# Patient Record
Sex: MALE | Race: WHITE
[De-identification: names, ages, dates, MRNs, and addresses within clinical notes are randomized per-mention and may not be internally consistent; named-entity substitution may affect disease eponyms.]

---

## 2019-06-23 ENCOUNTER — HOSPITAL ENCOUNTER (EMERGENCY)
Dept: HOSPITAL 72 - EMR | Age: 59
Discharge: HOME | End: 2019-06-23
Payer: COMMERCIAL

## 2019-06-23 VITALS — SYSTOLIC BLOOD PRESSURE: 121 MMHG | DIASTOLIC BLOOD PRESSURE: 84 MMHG

## 2019-06-23 VITALS — SYSTOLIC BLOOD PRESSURE: 166 MMHG | DIASTOLIC BLOOD PRESSURE: 96 MMHG

## 2019-06-23 VITALS — HEIGHT: 73 IN | WEIGHT: 215 LBS | BODY MASS INDEX: 28.49 KG/M2

## 2019-06-23 DIAGNOSIS — K57.92: Primary | ICD-10-CM

## 2019-06-23 DIAGNOSIS — F32.9: ICD-10-CM

## 2019-06-23 LAB
ADD MANUAL DIFF: NO
ALBUMIN SERPL-MCNC: 4.7 G/DL (ref 3.4–5)
ALBUMIN/GLOB SERPL: 1.7 {RATIO} (ref 1–2.7)
ALP SERPL-CCNC: 67 U/L (ref 46–116)
ALT SERPL-CCNC: 30 U/L (ref 12–78)
ANION GAP SERPL CALC-SCNC: 16 MMOL/L (ref 5–15)
APPEARANCE UR: CLEAR
APTT PPP: (no result) S
AST SERPL-CCNC: 10 U/L (ref 15–37)
BASOPHILS NFR BLD AUTO: 1.1 % (ref 0–2)
BILIRUB SERPL-MCNC: 0.4 MG/DL (ref 0.2–1)
BUN SERPL-MCNC: 10 MG/DL (ref 7–18)
CALCIUM SERPL-MCNC: 9.5 MG/DL (ref 8.5–10.1)
CHLORIDE SERPL-SCNC: 99 MMOL/L (ref 98–107)
CO2 SERPL-SCNC: 26 MMOL/L (ref 21–32)
CREAT SERPL-MCNC: 1.3 MG/DL (ref 0.55–1.3)
EOSINOPHIL NFR BLD AUTO: 2.2 % (ref 0–3)
ERYTHROCYTE [DISTWIDTH] IN BLOOD BY AUTOMATED COUNT: 11.2 % (ref 11.6–14.8)
GLOBULIN SER-MCNC: 2.7 G/DL
GLUCOSE UR STRIP-MCNC: NEGATIVE MG/DL
HCT VFR BLD CALC: 49.2 % (ref 42–52)
HGB BLD-MCNC: 17.5 G/DL (ref 14.2–18)
KETONES UR QL STRIP: NEGATIVE
LEUKOCYTE ESTERASE UR QL STRIP: NEGATIVE
LYMPHOCYTES NFR BLD AUTO: 34.6 % (ref 20–45)
MCV RBC AUTO: 83 FL (ref 80–99)
MONOCYTES NFR BLD AUTO: 9.1 % (ref 1–10)
NEUTROPHILS NFR BLD AUTO: 53 % (ref 45–75)
NITRITE UR QL STRIP: NEGATIVE
PH UR STRIP: 6 [PH] (ref 4.5–8)
PLATELET # BLD: 282 K/UL (ref 150–450)
POTASSIUM SERPL-SCNC: 3 MMOL/L (ref 3.5–5.1)
PROT UR QL STRIP: NEGATIVE
RBC # BLD AUTO: 5.92 M/UL (ref 4.7–6.1)
SODIUM SERPL-SCNC: 140 MMOL/L (ref 136–145)
SP GR UR STRIP: 1.02 (ref 1–1.03)
UROBILINOGEN UR-MCNC: NORMAL MG/DL (ref 0–1)
WBC # BLD AUTO: 12.3 K/UL (ref 4.8–10.8)

## 2019-06-23 PROCEDURE — 96374 THER/PROPH/DIAG INJ IV PUSH: CPT

## 2019-06-23 PROCEDURE — 96361 HYDRATE IV INFUSION ADD-ON: CPT

## 2019-06-23 PROCEDURE — 96375 TX/PRO/DX INJ NEW DRUG ADDON: CPT

## 2019-06-23 PROCEDURE — 99284 EMERGENCY DEPT VISIT MOD MDM: CPT

## 2019-06-23 PROCEDURE — 85025 COMPLETE CBC W/AUTO DIFF WBC: CPT

## 2019-06-23 PROCEDURE — 83690 ASSAY OF LIPASE: CPT

## 2019-06-23 PROCEDURE — 74177 CT ABD & PELVIS W/CONTRAST: CPT

## 2019-06-23 PROCEDURE — 81003 URINALYSIS AUTO W/O SCOPE: CPT

## 2019-06-23 PROCEDURE — 36415 COLL VENOUS BLD VENIPUNCTURE: CPT

## 2019-06-23 PROCEDURE — 80053 COMPREHEN METABOLIC PANEL: CPT

## 2019-06-23 NOTE — EMERGENCY ROOM REPORT
History of Present Illness


General


Chief Complaint:  Abdominal Pain


Source:  Patient





Present Illness


HPI


58-year-old male presents ED for evaluation.  Walked in complaining of 

abdominal pain which started early this morning.  Is cramping, right-sided, 

radiating to the back.  5 out of 10.  Denies nausea or vomiting.  Denies 

diarrhea.  Denies sick contacts recent travel.  Denies chest pain or shortness 

of breath.  No other aggravating relieving factors.  Denies any other 

associated symptoms


Allergies:  


Coded Allergies:  


     No Known Allergies (Unverified , 6/23/19)





Patient History


Past Medical History:  psych hx


Past Surgical History:  none


Pertinent Family History:  none


Social History:  Denies: smoking, alcohol use, drug use


Immunizations:  UTD


Reviewed Nursing Documentation:  PMH: Agreed; PSxH: Agreed





Nursing Documentation-PMH


Past Medical History:  No History, Except For


History Of Psychiatric Problem:  Yes - DEPRESSION





Review of Systems


All Other Systems:  negative except mentioned in HPI





Physical Exam





Vital Signs








  Date Time  Temp Pulse Resp B/P (MAP) Pulse Ox O2 Delivery O2 Flow Rate FiO2


 


6/23/19 06:24 97.9 68 16 160/89 (112) 98 Room Air  








Sp02 EP Interpretation:  reviewed, normal


General Appearance:  no apparent distress, alert, GCS 15, non-toxic


Head:  normocephalic, atraumatic


Eyes:  bilateral eye normal inspection, bilateral eye PERRL


ENT:  hearing grossly normal, normal pharynx, no angioedema, normal voice


Neck:  full range of motion, supple/symm/no masses


Respiratory:  chest non-tender, lungs clear, normal breath sounds, speaking 

full sentences


Cardiovascular #1:  regular rate, rhythm, no edema


Cardiovascular #2:  2+ carotid (R), 2+ carotid (L), 2+ radial (R), 2+ radial (L)

, 2+ dorsalis pedis (R), 2+ dorsalis pedis (L)


Gastrointestinal:  normal bowel sounds, soft, non-distended, no guarding, no 

rebound, tenderness - R sided abdomen


Rectal:  deferred


Genitourinary:  normal inspection, no CVA tenderness


Musculoskeletal:  back normal, gait/station normal, normal range of motion, non-

tender


Neurologic:  alert, oriented x3, responsive, motor strength/tone normal, 

sensory intact, speech normal


Psychiatric:  judgement/insight normal, memory normal, mood/affect normal, no 

suicidal/homicidal ideation


Reflexes:  3+ bicep (R), 3+ bicep (L), 3+ tricep (R), 3+ tricep (L), 3+ knee (R)

, 3+ knee (L)


Skin:  normal color, no rash, warm/dry, well hydrated


Lymphatic:  no adenopathy





Medical Decision Making


Diagnostic Impression:  


 Primary Impression:  


 Diverticulitis


ER Course


Hospital Course 


58-year-old M presents to ED with abdominal pain 





Differential diagnoses include: appendicitis, diverticulitis, SBO, 

gastroenteritis 





Clinical course


Patient placed on stretcher.  Cardiac monitor.  After initial history and 

physical I ordered labs, IV fluids, UA, pain medication and CT scan 





Labs - no leukocytosis, Hb/Hct stable.  electrolytes ok.  


CT abdomen and pelvis - diverticulitis vs colitis





Discussed findings with the patient.  Afebrile, nontoxic-appearing.  Stable 

vitals.  Unremarkable labs.  Patient can likely be discharged home with 

antibiotics.  Given Cipro and Flagyl here.  Safe for discharge with close 

outpatient follow-up.  Will provide referrals





I feel this is a highly complex case requiring extensive working including EKG/

Rhythm strip, Xray/CT/US, Blood/urine lab work, repeat exams while in ED, and 

administration of strong opiates/narcotics for pain control, admission to 

hospital or close patient follow up.  





Diagnosis - divertculitis 





stable and discharged to home with prescription for Cipro and Flagyl, motrin.  

Followup with PMD.  Return to ED if symptoms recur or worsen





Labs








Test


  6/23/19


06:30


 


White Blood Count


  12.3 K/UL


(4.8-10.8)


 


Red Blood Count


  5.92 M/UL


(4.70-6.10)


 


Hemoglobin


  17.5 G/DL


(14.2-18.0)


 


Hematocrit


  49.2 %


(42.0-52.0)


 


Mean Corpuscular Volume 83 FL (80-99) 


 


Mean Corpuscular Hemoglobin


  29.6 PG


(27.0-31.0)


 


Mean Corpuscular Hemoglobin


Concent 35.6 G/DL


(32.0-36.0)


 


Red Cell Distribution Width


  11.2 %


(11.6-14.8)


 


Platelet Count


  282 K/UL


(150-450)


 


Mean Platelet Volume


  7.6 FL


(6.5-10.1)


 


Neutrophils (%) (Auto)


  53.0 %


(45.0-75.0)


 


Lymphocytes (%) (Auto)


  34.6 %


(20.0-45.0)


 


Monocytes (%) (Auto)


  9.1 %


(1.0-10.0)


 


Eosinophils (%) (Auto)


  2.2 %


(0.0-3.0)


 


Basophils (%) (Auto)


  1.1 %


(0.0-2.0)


 


Urine Color Pale yellow 


 


Urine Appearance Clear 


 


Urine pH 6 (4.5-8.0) 


 


Urine Specific Gravity


  1.020


(1.005-1.035)


 


Urine Protein


  Negative


(NEGATIVE)


 


Urine Glucose (UA)


  Negative


(NEGATIVE)


 


Urine Ketones


  Negative


(NEGATIVE)


 


Urine Blood


  Negative


(NEGATIVE)


 


Urine Nitrite


  Negative


(NEGATIVE)


 


Urine Bilirubin


  Negative


(NEGATIVE)


 


Urine Urobilinogen


  Normal MG/DL


(0.0-1.0)


 


Urine Leukocyte Esterase


  Negative


(NEGATIVE)


 


Sodium Level


  140 MMOL/L


(136-145)


 


Potassium Level


  3.0 MMOL/L


(3.5-5.1)


 


Chloride Level


  99 MMOL/L


()


 


Carbon Dioxide Level


  26 MMOL/L


(21-32)


 


Anion Gap


  16 mmol/L


(5-15)


 


Blood Urea Nitrogen


  10 mg/dL


(7-18)


 


Creatinine


  1.3 MG/DL


(0.55-1.30)


 


Estimat Glomerular Filtration


Rate 56.9 mL/min


(>60)


 


Glucose Level


  144 MG/DL


()


 


Calcium Level


  9.5 MG/DL


(8.5-10.1)


 


Total Bilirubin


  0.4 MG/DL


(0.2-1.0)


 


Aspartate Amino Transf


(AST/SGOT) 10 U/L (15-37) 


 


 


Alanine Aminotransferase


(ALT/SGPT) 30 U/L (12-78) 


 


 


Alkaline Phosphatase


  67 U/L


()


 


Total Protein


  7.4 G/DL


(6.4-8.2)


 


Albumin


  4.7 G/DL


(3.4-5.0)


 


Globulin 2.7 g/dL 


 


Albumin/Globulin Ratio 1.7 (1.0-2.7) 


 


Lipase


  177 U/L


()








CT/MRI/US Diagnostic Results


CT/MRI/US Diagnostic Results :  


   Imaging Test Ordered:  CT A/P


   Impression


1. Colonic diverticulosis. Mild wall thickening throughout the descending and 

sigmoid colon may be related to underdistention versus a low-grade 

diverticulitis or colitis. No adjacent inflammatory stranding, free air, or 

fluid.


2. Mild bilateral perinephric stranding, nonspecific. No evidence of 

obstructive uropathy.


3. 1.5 cm simple-appearing cyst in the left hepatic lobe.





Last Vital Signs








  Date Time  Temp Pulse Resp B/P (MAP) Pulse Ox O2 Delivery O2 Flow Rate FiO2


 


6/23/19 09:26 98.1 86 16 121/84 98 Room Air  








Status:  improved


Disposition:  HOME, SELF-CARE


Condition:  Stable


Scripts


Metronidazole* (FLAGYL*) 500 Mg Tablet


500 MG ORAL THREE TIMES A DAY, #21 TAB


   Prov: wJ Hazel MD         6/23/19 


Ciprofloxacin Hcl* (CIPROFLOXACIN HCL*) 500 Mg Tablet


500 MG ORAL Q12H, #14 TAB 0 Refills


   Prov: Jw Hazel MD         6/23/19 


Ibuprofen* (MOTRIN*) 600 Mg Tablet


600 MG ORAL Q8H PRN for For Pain, #30 TAB 0 Refills


   Prov: Jw Hazel MD         6/23/19


Referrals:  


NON PHYSICIAN (PCP)











[facility]


Patient Instructions:  Diverticulitis, Easy-to-Read











Jw Hazel MD Jun 23, 2019 09:58

## 2019-06-23 NOTE — NUR
ER DISCHARGE NOTE:



Patient is cleared to be discharged per ERMD, pt is aox4, accompanied by family 
member, on room air, with stable vital signs. pt was given dc and prescription 
electronically instructions, pt was able to verbalize understanding, pt id band 
and iv site removed without complications. pt is able to ambulate with steady 
gait. pt took all belongings.

## 2019-06-23 NOTE — DIAGNOSTIC IMAGING REPORT
EXAM:

  CT Abdomen and Pelvis With Intravenous Contrast

 

CLINICAL HISTORY:

  ABD PAIN

 

TECHNIQUE:

  Axial computed tomography images of the abdomen and pelvis with 

intravenous contrast.  CTDI is 18.16 mGy and DLP is 1048 mGy-cm.  One or 

more of the following dose reduction techniques were used: automated 

exposure control, adjustment of the mA and/or kV according to patient 

size, use of iterative reconstruction technique.

  Coronal and sagittal reformatted images were created and reviewed.

 

COMPARISON:

  No relevant prior studies available.

 

FINDINGS:

  Lung bases:  Scattered subcentimeter thin-walled pulmonary cysts versus 

blebs in bilateral lung bases.

 

 ABDOMEN:

  Liver:  1.5 cm simple-appearing cyst in the left hepatic lobe.

  Gallbladder and bile ducts:  Unremarkable.  No calcified stones.  No 

ductal dilation.

  Pancreas:  Unremarkable.  No mass.  No ductal dilation.

  Spleen:  Unremarkable.  No splenomegaly.

  Adrenals:  Unremarkable.  No mass.

  Kidneys and ureters:  Mild bilateral perinephric stranding, nonspecific.

  No evidence of obstructive uropathy.  No visible renal masses.

  Stomach and bowel:  Colonic diverticulosis.  Mild wall thickening 

throughout the descending and sigmoid colon may be related to 

underdistention versus a low-grade diverticulitis or colitis.  Remainder 

of the colon appears unremarkable.  No abnormally distended loops of 

small bowel.  GE junction and stomach appear unremarkable.

 

 PELVIS:

  Appendix:  The appendix appears normal.

  Bladder:  Unremarkable.  No visible stones.

  Reproductive:  The prostate gland and seminal vesicles appear 

unremarkable.

 

 ABDOMEN and PELVIS:

  Intraperitoneal space:  Unremarkable.  No free air.  No significant 

fluid collection.

  Bones/joints:  Mild degenerative disc space loss throughout the lumbar 

spine, most prominent at L4-5.  No acute fracture.  No dislocation.

  Soft tissues:  Unremarkable.

  Vasculature:  Unremarkable.  No abdominal aortic aneurysm.

  Lymph nodes:  Unremarkable.  No enlarged lymph nodes.

 

IMPRESSION:     

1.  Colonic diverticulosis.  Mild wall thickening throughout the 

descending and sigmoid colon may be related to underdistention versus a 

low-grade diverticulitis or colitis.  No adjacent inflammatory stranding, 

free air, or fluid.

2.  Mild bilateral perinephric stranding, nonspecific.  No evidence of 

obstructive uropathy.

3.  1.5 cm simple-appearing cyst in the left hepatic lobe.

## 2019-06-23 NOTE — NUR
ED Nurse Note:



pt went down to CT scan in improved pain level and stable condition. pt 
insisted to walk as stating "Walking actually reduces my pain."